# Patient Record
Sex: MALE | Race: WHITE | NOT HISPANIC OR LATINO | Employment: FULL TIME | URBAN - METROPOLITAN AREA
[De-identification: names, ages, dates, MRNs, and addresses within clinical notes are randomized per-mention and may not be internally consistent; named-entity substitution may affect disease eponyms.]

---

## 2022-05-02 ENCOUNTER — TRANSFERRED RECORDS (OUTPATIENT)
Dept: HEALTH INFORMATION MANAGEMENT | Facility: CLINIC | Age: 27
End: 2022-05-02

## 2022-05-02 ENCOUNTER — APPOINTMENT (OUTPATIENT)
Dept: GENERAL RADIOLOGY | Facility: CLINIC | Age: 27
End: 2022-05-02
Attending: EMERGENCY MEDICINE
Payer: MEDICAID

## 2022-05-02 ENCOUNTER — HOSPITAL ENCOUNTER (EMERGENCY)
Facility: CLINIC | Age: 27
Discharge: HOME | End: 2022-05-02
Attending: EMERGENCY MEDICINE | Admitting: EMERGENCY MEDICINE
Payer: MEDICAID

## 2022-05-02 VITALS
RESPIRATION RATE: 18 BRPM | SYSTOLIC BLOOD PRESSURE: 132 MMHG | HEART RATE: 92 BPM | DIASTOLIC BLOOD PRESSURE: 77 MMHG | TEMPERATURE: 98.2 F | OXYGEN SATURATION: 98 %

## 2022-05-02 DIAGNOSIS — S00.83XA TRAUMATIC HEMATOMA OF FOREHEAD, INITIAL ENCOUNTER: ICD-10-CM

## 2022-05-02 DIAGNOSIS — R07.89 ATYPICAL CHEST PAIN: ICD-10-CM

## 2022-05-02 DIAGNOSIS — R55 VASOVAGAL SYNCOPE: ICD-10-CM

## 2022-05-02 LAB
ANION GAP SERPL CALCULATED.3IONS-SCNC: 5 MMOL/L (ref 3–14)
ATRIAL RATE - MUSE: 72 BPM
BASOPHILS # BLD AUTO: 0.1 10E3/UL (ref 0–0.2)
BASOPHILS NFR BLD AUTO: 1 %
BUN SERPL-MCNC: 18 MG/DL (ref 7–30)
CALCIUM SERPL-MCNC: 9.4 MG/DL (ref 8.5–10.1)
CHLORIDE BLD-SCNC: 107 MMOL/L (ref 94–109)
CO2 SERPL-SCNC: 25 MMOL/L (ref 20–32)
CREAT SERPL-MCNC: 1.05 MG/DL (ref 0.66–1.25)
D DIMER PPP FEU-MCNC: 0.35 UG/ML FEU (ref 0–0.5)
DIASTOLIC BLOOD PRESSURE - MUSE: NORMAL MMHG
EOSINOPHIL # BLD AUTO: 0.1 10E3/UL (ref 0–0.7)
EOSINOPHIL NFR BLD AUTO: 1 %
ERYTHROCYTE [DISTWIDTH] IN BLOOD BY AUTOMATED COUNT: 12.2 % (ref 10–15)
GFR SERPL CREATININE-BSD FRML MDRD: >90 ML/MIN/1.73M2
GLUCOSE BLD-MCNC: 92 MG/DL (ref 70–99)
HCT VFR BLD AUTO: 42.6 % (ref 40–53)
HGB BLD-MCNC: 14.5 G/DL (ref 13.3–17.7)
HOLD SPECIMEN: NORMAL
IMM GRANULOCYTES # BLD: 0 10E3/UL
IMM GRANULOCYTES NFR BLD: 0 %
INTERPRETATION ECG - MUSE: NORMAL
LYMPHOCYTES # BLD AUTO: 2.1 10E3/UL (ref 0.8–5.3)
LYMPHOCYTES NFR BLD AUTO: 22 %
MCH RBC QN AUTO: 30.4 PG (ref 26.5–33)
MCHC RBC AUTO-ENTMCNC: 34 G/DL (ref 31.5–36.5)
MCV RBC AUTO: 89 FL (ref 78–100)
MONOCYTES # BLD AUTO: 0.7 10E3/UL (ref 0–1.3)
MONOCYTES NFR BLD AUTO: 7 %
NEUTROPHILS # BLD AUTO: 6.6 10E3/UL (ref 1.6–8.3)
NEUTROPHILS NFR BLD AUTO: 69 %
NRBC # BLD AUTO: 0 10E3/UL
NRBC BLD AUTO-RTO: 0 /100
P AXIS - MUSE: 45 DEGREES
PLATELET # BLD AUTO: 282 10E3/UL (ref 150–450)
POTASSIUM BLD-SCNC: 4 MMOL/L (ref 3.4–5.3)
PR INTERVAL - MUSE: 140 MS
QRS DURATION - MUSE: 88 MS
QT - MUSE: 360 MS
QTC - MUSE: 394 MS
R AXIS - MUSE: 18 DEGREES
RBC # BLD AUTO: 4.77 10E6/UL (ref 4.4–5.9)
SODIUM SERPL-SCNC: 137 MMOL/L (ref 133–144)
SYSTOLIC BLOOD PRESSURE - MUSE: NORMAL MMHG
T AXIS - MUSE: 33 DEGREES
TROPONIN I SERPL HS-MCNC: 4 NG/L
VENTRICULAR RATE- MUSE: 72 BPM
WBC # BLD AUTO: 9.6 10E3/UL (ref 4–11)

## 2022-05-02 PROCEDURE — 85025 COMPLETE CBC W/AUTO DIFF WBC: CPT | Performed by: EMERGENCY MEDICINE

## 2022-05-02 PROCEDURE — 85379 FIBRIN DEGRADATION QUANT: CPT | Performed by: EMERGENCY MEDICINE

## 2022-05-02 PROCEDURE — 36415 COLL VENOUS BLD VENIPUNCTURE: CPT | Performed by: EMERGENCY MEDICINE

## 2022-05-02 PROCEDURE — 82947 ASSAY GLUCOSE BLOOD QUANT: CPT | Performed by: EMERGENCY MEDICINE

## 2022-05-02 PROCEDURE — 84484 ASSAY OF TROPONIN QUANT: CPT | Performed by: EMERGENCY MEDICINE

## 2022-05-02 PROCEDURE — 93005 ELECTROCARDIOGRAM TRACING: CPT

## 2022-05-02 PROCEDURE — 82374 ASSAY BLOOD CARBON DIOXIDE: CPT | Performed by: EMERGENCY MEDICINE

## 2022-05-02 PROCEDURE — 82435 ASSAY OF BLOOD CHLORIDE: CPT | Performed by: EMERGENCY MEDICINE

## 2022-05-02 PROCEDURE — 71046 X-RAY EXAM CHEST 2 VIEWS: CPT

## 2022-05-02 PROCEDURE — 99285 EMERGENCY DEPT VISIT HI MDM: CPT | Mod: 25

## 2022-05-02 ASSESSMENT — ENCOUNTER SYMPTOMS
DIZZINESS: 1
APPETITE CHANGE: 0

## 2022-05-02 NOTE — ED TRIAGE NOTES
Patient sent to the ED from urgent care following a syncopal episode. Patient states began having chest pain and got dizzy this morning while eating. Passed out and hit head when fell from a seated position. States dizziness has resolved, but continues to have chest pain.

## 2022-05-02 NOTE — ED PROVIDER NOTES
History   Chief Complaint:  Loss of Consciousness     The history is provided by the patient.      Hernesto Childers is a 27 year old male who presents with loss of consciousness. Patient was sitting at the table this morning eating breakfast at 7 am when he developed left sided chest pain and dizziness and then had a syncopal episode. He describes it as a throbbing chest pain which worsens when he breathes in. He hit his head on the ground when he passed out. He has a hematoma to his forehead. He reports he felt clammy prior to passing out and was pale. He felt fine last night when he went to sleep. He has been eating and drinking okay. He also endorses right leg pain ongoing for a few months. He reports history of sciatica following back surgery 6 years ago and this feels similar. He took 2000 mg of Tylenol for his leg pain this morning. He usually only takes 1000 mg. He was initially evaluated at Urgent Care prior to being sent here for further evaluation.     Review of Systems   Constitutional: Negative for appetite change.   Cardiovascular: Positive for chest pain.   Musculoskeletal:        Positive for right leg pain   Neurological: Positive for dizziness and syncope.   All other systems reviewed and are negative.    Allergies:  The patient has no known allergies.     Medications:  The patient denies the use of medications.     Past Medical History:     Episodic mood disorder  Pervasive developmental disorder       Past Surgical History:    Back surgery     Social History:  Presents to ED with visitor     Physical Exam     Patient Vitals for the past 24 hrs:   BP Temp Pulse Resp SpO2   05/02/22 0931 (!) 145/83 -- 68 18 99 %   05/02/22 0915 121/76 -- 77 26 99 %   05/02/22 0835 -- 98.2  F (36.8  C) 95 18 98 %       Physical Exam  Vitals and nursing note reviewed.   HENT:      Head: Normocephalic.      Comments: Small to moderate hematoma to left forehead.  No open wound     Right Ear: Tympanic membrane normal.       Left Ear: Tympanic membrane normal.      Nose: Nose normal.      Mouth/Throat:      Mouth: Mucous membranes are moist.   Eyes:      Pupils: Pupils are equal, round, and reactive to light.   Cardiovascular:      Rate and Rhythm: Normal rate and regular rhythm.      Pulses: Normal pulses.      Heart sounds: Normal heart sounds.   Pulmonary:      Effort: Pulmonary effort is normal.      Breath sounds: Normal breath sounds.   Abdominal:      General: Abdomen is flat. Bowel sounds are normal.      Palpations: Abdomen is soft.   Musculoskeletal:         General: Normal range of motion.      Cervical back: Normal range of motion. No tenderness.   Skin:     General: Skin is warm.      Capillary Refill: Capillary refill takes less than 2 seconds.   Neurological:      General: No focal deficit present.      Mental Status: He is alert.   Psychiatric:         Mood and Affect: Mood normal.           Emergency Department Course   ECG ([facility]):  ECG obtained at 0809, ECG read at 0851  Normal sinus rhythm with sinus arrhythmia  Normal ECG    Rate 64 bpm. MA interval 144 ms. QRS duration 90 ms. QT/QTc 364/375 ms. P-R-T axes 45 22 33.     ECG:  ECG taken at 0910, ECG read at 0915  Sinus rhythm with marked sinus arrhythmia  Otherwise normal ECG  Rate 72 bpm. MA interval 140. QRS duration 88. QT/QTc 360/394. P-R-T axes 45 18 33.     Imaging:  Chest XR,  PA & LAT   Final Result   IMPRESSION: No radiographic evidence of acute chest abnormality.       KIRIT TUBBS MD            SYSTEM ID:  [id number]      Report per Radiology.     Laboratory:  Labs Ordered and Resulted from Time of ED Arrival to Time of ED Departure   D DIMER QUANTITATIVE - Normal       Result Value    D-Dimer Quantitative 0.35     BASIC METABOLIC PANEL - Normal    Sodium 137      Potassium 4.0      Chloride 107      Carbon Dioxide (CO2) 25      Anion Gap 5      Urea Nitrogen 18      Creatinine 1.05      Calcium 9.4      Glucose 92       GFR Estimate >90     TROPONIN I - Normal    Troponin I High Sensitivity 4     CBC WITH PLATELETS AND DIFFERENTIAL    WBC Count 9.6      RBC Count 4.77      Hemoglobin 14.5      Hematocrit 42.6      MCV 89      MCH 30.4      MCHC 34.0      RDW 12.2      Platelet Count 282      % Neutrophils 69      % Lymphocytes 22      % Monocytes 7      % Eosinophils 1      % Basophils 1      % Immature Granulocytes 0      NRBCs per 100 WBC 0      Absolute Neutrophils 6.6      Absolute Lymphocytes 2.1      Absolute Monocytes 0.7      Absolute Eosinophils 0.1      Absolute Basophils 0.1      Absolute Immature Granulocytes 0.0      Absolute NRBCs 0.0        Emergency Department Course:     Reviewed:  I reviewed nursing notes, vitals, past medical history and Care Everywhere    Assessments:  0848 I obtained history and examined the patient as noted above.   0949 I rechecked and updated the patient.   1031 I rechecked the patient and explained findings.     Disposition:  The patient was discharged to home.     Impression & Plan     Medical Decision Making:  Patient presents with a episode of syncope suspect vasovagal with a small hematoma to the forehead.  Patient does describe vague chest pain.  X-ray shows no pneumothorax patient is PERC negative and not at risk for PE.  Troponin and EKG are normal.  Suspect musculoskeletal chest pain.  In the setting of vasovagal syncope or anxiety.  Patient was offered reassurance and discharged home.  Patient's vitals are stable orthostatics are normal encouraged to follow-up with primary care for reassessment as indicated.  Girlfriend was asking about head imaging.  This was not performed as the history was suggestive of syncope and then head injury patient is well-appearing at 27 and does not meet [facility] head CT rules for head imaging C-spine was cleared clinically using [facility].    Diagnosis:    ICD-10-CM    1. Traumatic hematoma of forehead, initial encounter  S00.83XA    2. Vasovagal syncope   R55    3. Atypical chest pain  R07.89        Scribe Disclosure:  I, Pawan Poncediego, am serving as a scribe at 8:47 AM on 5/2/2022 to document services personally performed by Nathan Johnson MD based on my observations and the provider's statements to me.            Nathan Johnson MD  05/04/22 [id number]

## 2022-05-02 NOTE — DISCHARGE INSTRUCTIONS
We have performed lab tests and chest x-ray there is no signs of a primary heart or primary lung condition causing chest pain.  We have no test answers to the passing out as we have discussed this is common.  Suspect vasovagal event.  If you feel dizzy or lightheaded please sit down or lie down.  If you continue to have chest pain please follow-up with your regular doctor return to the emergency room with severe increase in shortness of breath fever greater than 100.4 or worsening condition.

## 2022-05-22 ENCOUNTER — HOSPITAL ENCOUNTER (EMERGENCY)
Facility: CLINIC | Age: 27
Discharge: HOME | End: 2022-05-22
Attending: PHYSICIAN ASSISTANT | Admitting: PHYSICIAN ASSISTANT
Payer: MEDICAID

## 2022-05-22 ENCOUNTER — APPOINTMENT (OUTPATIENT)
Dept: MRI IMAGING | Facility: CLINIC | Age: 27
End: 2022-05-22
Attending: PHYSICIAN ASSISTANT
Payer: MEDICAID

## 2022-05-22 VITALS
DIASTOLIC BLOOD PRESSURE: 82 MMHG | RESPIRATION RATE: 18 BRPM | HEART RATE: 104 BPM | SYSTOLIC BLOOD PRESSURE: 130 MMHG | TEMPERATURE: 98.5 F | OXYGEN SATURATION: 97 %

## 2022-05-22 DIAGNOSIS — M51.26 LUMBAR DISC HERNIATION: ICD-10-CM

## 2022-05-22 LAB
ANION GAP SERPL CALCULATED.3IONS-SCNC: 6 MMOL/L (ref 3–14)
BASOPHILS # BLD AUTO: 0 10E3/UL (ref 0–0.2)
BASOPHILS NFR BLD AUTO: 0 %
BUN SERPL-MCNC: 21 MG/DL (ref 7–30)
CALCIUM SERPL-MCNC: 9 MG/DL (ref 8.5–10.1)
CHLORIDE BLD-SCNC: 103 MMOL/L (ref 94–109)
CO2 SERPL-SCNC: 27 MMOL/L (ref 20–32)
CREAT SERPL-MCNC: 0.83 MG/DL (ref 0.66–1.25)
D DIMER PPP FEU-MCNC: 0.36 UG/ML FEU (ref 0–0.5)
EOSINOPHIL # BLD AUTO: 0.1 10E3/UL (ref 0–0.7)
EOSINOPHIL NFR BLD AUTO: 1 %
ERYTHROCYTE [DISTWIDTH] IN BLOOD BY AUTOMATED COUNT: 12.9 % (ref 10–15)
GFR SERPL CREATININE-BSD FRML MDRD: >90 ML/MIN/1.73M2
GLUCOSE BLD-MCNC: 128 MG/DL (ref 70–99)
HCT VFR BLD AUTO: 47.1 % (ref 40–53)
HGB BLD-MCNC: 15.8 G/DL (ref 13.3–17.7)
HOLD SPECIMEN: NORMAL
IMM GRANULOCYTES # BLD: 0.1 10E3/UL
IMM GRANULOCYTES NFR BLD: 1 %
LYMPHOCYTES # BLD AUTO: 1.5 10E3/UL (ref 0.8–5.3)
LYMPHOCYTES NFR BLD AUTO: 12 %
MCH RBC QN AUTO: 30 PG (ref 26.5–33)
MCHC RBC AUTO-ENTMCNC: 33.5 G/DL (ref 31.5–36.5)
MCV RBC AUTO: 89 FL (ref 78–100)
MONOCYTES # BLD AUTO: 0.3 10E3/UL (ref 0–1.3)
MONOCYTES NFR BLD AUTO: 2 %
NEUTROPHILS # BLD AUTO: 11 10E3/UL (ref 1.6–8.3)
NEUTROPHILS NFR BLD AUTO: 84 %
NRBC # BLD AUTO: 0 10E3/UL
NRBC BLD AUTO-RTO: 0 /100
PLATELET # BLD AUTO: 316 10E3/UL (ref 150–450)
POTASSIUM BLD-SCNC: 4.2 MMOL/L (ref 3.4–5.3)
RBC # BLD AUTO: 5.27 10E6/UL (ref 4.4–5.9)
SODIUM SERPL-SCNC: 136 MMOL/L (ref 133–144)
WBC # BLD AUTO: 13 10E3/UL (ref 4–11)

## 2022-05-22 PROCEDURE — 255N000002 HC RX 255 OP 636: Performed by: PHYSICIAN ASSISTANT

## 2022-05-22 PROCEDURE — 85379 FIBRIN DEGRADATION QUANT: CPT | Performed by: PHYSICIAN ASSISTANT

## 2022-05-22 PROCEDURE — 99285 EMERGENCY DEPT VISIT HI MDM: CPT | Mod: 25

## 2022-05-22 PROCEDURE — 250N000013 HC RX MED GY IP 250 OP 250 PS 637: Performed by: PHYSICIAN ASSISTANT

## 2022-05-22 PROCEDURE — 82310 ASSAY OF CALCIUM: CPT | Performed by: PHYSICIAN ASSISTANT

## 2022-05-22 PROCEDURE — 85025 COMPLETE CBC W/AUTO DIFF WBC: CPT | Performed by: PHYSICIAN ASSISTANT

## 2022-05-22 PROCEDURE — 36415 COLL VENOUS BLD VENIPUNCTURE: CPT | Performed by: PHYSICIAN ASSISTANT

## 2022-05-22 PROCEDURE — A9585 GADOBUTROL INJECTION: HCPCS | Performed by: PHYSICIAN ASSISTANT

## 2022-05-22 PROCEDURE — 72158 MRI LUMBAR SPINE W/O & W/DYE: CPT

## 2022-05-22 RX ORDER — GADOBUTROL 604.72 MG/ML
12.5 INJECTION INTRAVENOUS ONCE
Status: DISCONTINUED | OUTPATIENT
Start: 2022-05-22 | End: 2022-05-22 | Stop reason: CLARIF

## 2022-05-22 RX ORDER — GABAPENTIN 100 MG/1
200 CAPSULE ORAL 3 TIMES DAILY
Status: DISCONTINUED | OUTPATIENT
Start: 2022-05-22 | End: 2022-05-22

## 2022-05-22 RX ORDER — GABAPENTIN 100 MG/1
200 CAPSULE ORAL
Status: COMPLETED | OUTPATIENT
Start: 2022-05-22 | End: 2022-05-22

## 2022-05-22 RX ORDER — GADOBUTROL 604.72 MG/ML
12 INJECTION INTRAVENOUS ONCE
Status: COMPLETED | OUTPATIENT
Start: 2022-05-22 | End: 2022-05-22

## 2022-05-22 RX ORDER — GABAPENTIN 100 MG/1
200 CAPSULE ORAL 3 TIMES DAILY
Qty: 84 CAPSULE | Refills: 0 | Status: SHIPPED | OUTPATIENT
Start: 2022-05-22 | End: 2022-06-05

## 2022-05-22 RX ORDER — CYCLOBENZAPRINE HCL 10 MG
10 TABLET ORAL 3 TIMES DAILY PRN
Qty: 15 TABLET | Refills: 0 | Status: SHIPPED | OUTPATIENT
Start: 2022-05-22

## 2022-05-22 RX ADMIN — GABAPENTIN 200 MG: 100 CAPSULE ORAL at 17:14

## 2022-05-22 RX ADMIN — GADOBUTROL 12 ML: 604.72 INJECTION INTRAVENOUS at 15:46

## 2022-05-22 ASSESSMENT — ENCOUNTER SYMPTOMS
BACK PAIN: 1
FEVER: 0
CHILLS: 0
NUMBNESS: 1
MYALGIAS: 1

## 2022-05-22 NOTE — ED PROVIDER NOTES
History   Chief Complaint:  Numbness and Back Pain       HPI   Hernesto Childers is a 27 year old male who presents with low back pain that radiates down through his right hip, and right foot numbness that started a week ago. He has burning pain down his right leg. He describes that his right calf tightens almost like his calf will cramp but does not. He sometimes gets sharp pain on his right leg. He is able to walk but sometimes feels like his right leg is heavier after walking for awile. Sitting up and standing worsens the pain and numbness.  gave him deltasone and flexeril which he took today. This has helped some but symptoms have persisted. He denies fever or chills. He denies loss of bowel and urine. No abdominal pain, dysuria or hematuria.  He has had back surgery 6 years ago and he has had right leg problems before. He denies any medical problems including DM.     Review of Systems   Constitutional: Negative for chills and fever.   Musculoskeletal: Positive for back pain and myalgias.   Neurological: Positive for numbness.   All other systems reviewed and are negative.    Allergies:  The patient has no known allergies.      Medications:  The patient denies the use of medications.      Past Medical History:     Episodic mood disorder  Pervasive developmental disorder        Past Surgical History:    Back surgery      Social History:  Presents to ED with visitor     Physical Exam     Patient Vitals for the past 24 hrs:   BP Temp Temp src Pulse Resp SpO2   05/22/22 1700 -- -- -- -- -- 97 %   05/22/22 1645 -- -- -- -- -- 95 %   05/22/22 1630 130/82 -- -- 104 -- 98 %   05/22/22 1530 129/66 -- -- 96 -- 99 %   05/22/22 1515 106/88 -- -- 94 -- 95 %   05/22/22 1500 139/83 -- -- 94 -- 97 %   05/22/22 1400 132/89 -- -- 78 -- 99 %   05/22/22 1317 (!) 164/110 98.5  F (36.9  C) Oral 120 18 100 %       Physical Exam  General: Awake, alert, non-toxic.  Head:  Scalp is NC/AT  Eyes:  Conjunctiva normal, PERRL  ENT:  The  external nose and ears are normal.   Neck:  Normal range of motion without rigidity.  CV:  Regular rate and rhythm    No pathologic murmur, rubs, or gallops.  Resp:  Breath sounds are clear bilaterally    Non-labored, no retractions or accessory muscle use  Abdomen: Abdomen is soft, no distension, no tenderness, no masses. No CVA tenderness.  MS:  No lower extremity edema or asymmetric calf swelling. No midline cervical, thoracic, or lumbar tenderness  Skin:  Warm and dry, No rash or lesions noted.  Neuro: Alert and oriented.  GCS 15 5/5 strength BL in UE and LE, normal sensation to touch.  Gait normal  Psych:  Awake. Alert. Normal affect. Appropriate interactions.  Emergency Department Course   ECG  ECG results from 05/02/22   EKG 12 lead     Value    Systolic Blood Pressure     Diastolic Blood Pressure     Ventricular Rate 72    Atrial Rate 72    NV Interval 140    QRS Duration 88        QTc 394    P Axis 45    R AXIS 18    T Axis 33    Interpretation ECG      Sinus rhythm with marked sinus arrhythmia  Otherwise normal ECG  No previous ECGs available  Confirmed by - EMERGENCY ROOM, PHYSICIAN ([id number]),  KEE GIFFORD ([id number]) on 5/2/2022 1:43:13 PM         Imaging:  Lumbar spine MRI w & w/o contrast - surgery <10yrs   Final Result   IMPRESSION:   1.  Moderate-sized L5-S1 disc extrusion with right greater than left S1 nerve contact and mild central canal stenosis.      2.  L4-L5 postop changes. Mild residual disc bulge without definite recurrent disc extrusion.        Report per radiology    Laboratory:  Labs Ordered and Resulted from Time of ED Arrival to Time of ED Departure   BASIC METABOLIC PANEL - Abnormal       Result Value    Sodium 136      Potassium 4.2      Chloride 103      Carbon Dioxide (CO2) 27      Anion Gap 6      Urea Nitrogen 21      Creatinine 0.83      Calcium 9.0      Glucose 128 (*)     GFR Estimate >90     CBC WITH PLATELETS AND DIFFERENTIAL - Abnormal    WBC Count 13.0 (*)      RBC Count 5.27      Hemoglobin 15.8      Hematocrit 47.1      MCV 89      MCH 30.0      MCHC 33.5      RDW 12.9      Platelet Count 316      % Neutrophils 84      % Lymphocytes 12      % Monocytes 2      % Eosinophils 1      % Basophils 0      % Immature Granulocytes 1      NRBCs per 100 WBC 0      Absolute Neutrophils 11.0 (*)     Absolute Lymphocytes 1.5      Absolute Monocytes 0.3      Absolute Eosinophils 0.1      Absolute Basophils 0.0      Absolute Immature Granulocytes 0.1      Absolute NRBCs 0.0     D DIMER QUANTITATIVE - Normal    D-Dimer Quantitative 0.36        Emergency Department Course:    Reviewed:  I reviewed nursing notes, vitals, past medical history and Care Everywhere    Assessments:  1342 I obtained history and examined the patient as noted above.     1651 I rechecked the patient and explained findings.     Interventions:  1546 Gadavist 12 mL IV    1714 Neurontin 200 mg PO    Disposition:  The patient was discharged to home.     Impression & Plan     Medical Decision Makin-year-old male with history of low back surgery presents for low back pain shooting down the right leg.  Objectively normal strength and neurologic exam and able to ambulate however given prior surgery and report of weakness MRI obtained which showed L5-S1 disc extrusion as the source of this.  Fortunately there is no evidence of cauda equina syndrome, cord compression, spinal infection, hematoma, fracture, or other more concerning abnormality.  D-dimer normal no evidence of DVT.  No signs of arterial ischemia or compartment syndrome.  Symptoms clearly reproducible no evidence to suggest referred pain from intra-abdominal catastrophe, AAA, pyelonephritis, kidney stone etc.    We will trial course of gabapentin to add to his current regimen.  I will have him follow-up with neurosurgery as an outpatient for further evaluation.  Return precautions given for increasing weakness bowel or bladder incontinence groin numbness  fever abdominal pain or other concerns.      Diagnosis:    ICD-10-CM    1. Lumbar disc herniation  M51.26        Discharge Medications:  Discharge Medication List as of 5/22/2022  4:58 PM      START taking these medications    Details   cyclobenzaprine (FLEXERIL) 10 MG tablet Take 1 tablet (10 mg) by mouth 3 times daily as needed for other (Pain), Disp-15 tablet, R-0, E-Prescribe      gabapentin (NEURONTIN) 100 MG capsule Take 2 capsules (200 mg) by mouth 3 times daily for 14 days, Disp-84 capsule, R-0, E-Prescribe             Scribe Disclosure:  Anthony ARMAS, am serving as a scribe at 1:24 PM on 5/22/2022 to document services personally performed by Alvarez Connors PA based on my observations and the provider's statements to me.            Alvarez Connors PA-C  05/22/22 [id number]

## 2022-05-22 NOTE — ED TRIAGE NOTES
Patient presents to the ED reporting right leg pain and numbness. History of sciatica. States that pain began to flare up recently because does a lot of heavy lifting at work. Reports worsening pain radiating down right leg. States in the past week has had some numbness of the right leg and foot that gets worse with activity.

## 2023-08-30 ENCOUNTER — APPOINTMENT (OUTPATIENT)
Dept: CT IMAGING | Facility: CLINIC | Age: 28
End: 2023-08-30
Attending: EMERGENCY MEDICINE
Payer: COMMERCIAL

## 2023-08-30 ENCOUNTER — HOSPITAL ENCOUNTER (EMERGENCY)
Facility: CLINIC | Age: 28
Discharge: HOME | End: 2023-08-30
Attending: EMERGENCY MEDICINE | Admitting: EMERGENCY MEDICINE
Payer: COMMERCIAL

## 2023-08-30 VITALS
HEART RATE: 96 BPM | DIASTOLIC BLOOD PRESSURE: 79 MMHG | OXYGEN SATURATION: 97 % | SYSTOLIC BLOOD PRESSURE: 140 MMHG | RESPIRATION RATE: 18 BRPM | TEMPERATURE: 98.3 F

## 2023-08-30 DIAGNOSIS — F07.81 POST CONCUSSIVE SYNDROME: ICD-10-CM

## 2023-08-30 PROCEDURE — 99284 EMERGENCY DEPT VISIT MOD MDM: CPT | Mod: 25

## 2023-08-30 PROCEDURE — 70450 CT HEAD/BRAIN W/O DYE: CPT

## 2023-08-30 ASSESSMENT — ACTIVITIES OF DAILY LIVING (ADL): ADLS_ACUITY_SCORE: 35

## 2023-08-31 NOTE — ED PROVIDER NOTES
History     Chief Complaint:  Concussion       HPI   Hernesto Childers is a 28 year old male trip fall yesterday hit frontal head on hardwood floor.  Unsure LOC but No NV.  No focal neuro sx.  Ambulatory.  Works in purchasing and all day on computer make lightheadedness and mental slowing worse as well as vision blurry.  No neck pain CP SOB abd pain, extremity pain.        Independent Historian:        Review of External Notes:      Medications:    cyclobenzaprine (FLEXERIL) 10 MG tablet  gabapentin (NEURONTIN) 100 MG capsule        Past Medical History:    No past medical history on file.    Past Surgical History:    No past surgical history on file.       Physical Exam   Patient Vitals for the past 24 hrs:   BP Temp Pulse Resp SpO2   08/30/23 2156 -- -- -- -- 97 %   08/30/23 2146 (!) 140/79 -- -- -- --   08/30/23 2033 (!) 149/96 98.3  F (36.8  C) 96 18 100 %        Physical Exam  Constitutional: Patient is well appearing. No distress.  Head to toe trauma normal  Head: Atraumatic.  Eyes: Conjunctivae and EOM are normal. No scleral icterus.  Neck: Normal range of motion. Neck supple.  No midline tenderness.   Cardiovascular: Normal rate, regular rhythm, normal heart sounds and intact distal perfusion.   Pulmonary/Chest: Breath sounds normal. No respiratory distress.  Abdominal: Soft. Bowel sounds are normal. No distension. No tenderness. No rebound or guarding.   Musculoskeletal: Normal range of motion. No edema or tenderness.   Neurological: Alert and orientated to person, place, and time. No observable focal neuro deficit  Skin: Warm and dry. No rash noted. Not diaphoretic.      Emergency Department Course   ECG      Imaging:  CT Head w/o Contrast   Final Result   IMPRESSION:   1.  Normal head CT.        Report per radiology    Laboratory:  Labs Ordered and Resulted from Time of ED Arrival to Time of ED Departure - No data to display     Procedures       Emergency Department Course & Assessments:              Interventions:  Medications - No data to display     Assessments:      Independent Interpretation (X-rays, CTs, rhythm strip):  CT head normal    Consultations/Discussion of Management or Tests:         Social Determinants of Health affecting care:       Disposition:  The patient was discharged to home.     Impression & Plan        Medical Decision Making:  Hernesto Childers is a 28 year old male who presents for evaluation after fall with trauma to the head.  This patient has a history and clinical exam consistent with concussion.   The differential diagnosis includes skull fracture, concussion, epidural hematoma, subdural hematoma, intracerebral hemorrhage, and traumatic subarachnoid hemorrhage;  CT imaging is reassuring.     Return to ED for red flags (change in behavior, drowsiness, seizures, vomiting, etc) and gave concussion precautions for home.  I did stress importance of avoiding a second concussion while brain heals.  The patients head to toe trauma exam is otherwise negative for serious underlying disease of the head, neck, chest, abdomen, extremities, pelvis.      Diagnosis:    ICD-10-CM    1. Post concussive syndrome  F07.81            Discharge Medications:  New Prescriptions    No medications on file            8/30/2023   Scott Cruz MD Stevens, Andrew C, MD  08/30/23 221

## 2023-08-31 NOTE — ED TRIAGE NOTES
Pt fell and hit head yesterday and lost consciousness for about 1 minute. Now with concussion like symptoms, headache, difficulty concentrating, ringing in ears. Denies N/V or additional syncope.